# Patient Record
(demographics unavailable — no encounter records)

---

## 2024-10-16 NOTE — PHYSICAL EXAM
[Normal] : Gait: normal [Rodriguez's Sign] : negative Rodriguez's sign [Pronator Drift] : negative pronator drift [SLR] : negative straight leg raise [de-identified] : 5 out of 5 motor strength, sensation is intact and symmetrical full range of motion flexion extension and rotation, no palpatory tenderness full range of motion of hips knees shoulders and elbows (all four extremities), no atrophy, negative straight leg raise, no pathological reflexes, no swelling, normal ambulation, no apparent distress skin is intact, no palpable lymph nodes, no upper or lower extremity instability, alert and oriented x3 and normal mood. Normal finger-to nose test.  No upper motor neuron findings. Left SI joint pain. [de-identified] : I reviewed, interpreted and clinically correlated the following outside imaging studies,  CT CERVICAL SPINE ORDERED BY: ARIE SHORT  CT NECK SOFT TISSUE ORDERED BY: ARIE SHORT  PROCEDURE DATE: 10/08/2024    INTERPRETATION: CLINICAL INFORMATION: Anterior left neck pain with burning sensation radiating to her shoulders and cervical spine. History of right thyroid lobectomy (10/2020).  TECHNIQUE: Axial CT scanning of the brain was obtained from the C7 vertebral body to the vertex without the administration of intravenous contrast. Subsequent noncontrast CT of the soft tissues of the neck was performed. Sagittal and coronal reformatted images were obtained.  COMPARISON: CT chest 3/27/2023.  FINDINGS:  CT SOFT TISSUE NECK  The cutaneous and subcutaneous soft tissues are unremarkable. No evidence of collections. No lymphadenopathy.  The nasopharynx, oropharynx and larynx are unremarkable. No evidence of retropharyngeal soft tissue swelling.  Status post right thyroid lobectomy. Heterogeneous left thyroid lobe, grossly unchanged in appearance since 3/27/2023. Parotid and submandibular glands are within normal limits.  The epiglottis and aryepiglottic folds are unremarkable. No airway obstruction or tracheal deviation. No foreign body.  Esophagus is underdistended.  Upper dentures.  CT CERVICAL SPINE  There is normal cervical vertebral body height and alignment. Irregular contour of the anterior C2 vertebral body is stable compared to previous x-rays 6/3/2019. This may be related to previous trauma or congenital nature. Multilevel anterior and posterior osteophyte formation is appreciated, most prominent from C4 through C6.  Intervertebral disc space narrowing is appreciated at C5-6, C6-C7 and C7-T1. Multilevel uncovertebral hypertrophy and posterior facet arthropathies appreciated. The prevertebral soft tissues are normal. Please note that CT is not the optimal modality for evaluation of disc disease, which is better evaluated with MRI.  Miscellaneous: Emphysema. Aortic and branch vessel calcifications.   IMPRESSION: No acute pathology of the soft tissues of the neck.  Heterogeneous left thyroid lobe, unchanged since 3/27/2023.  No acute fracture. No spondylolisthesis. Multilevel degenerative changes of the cervical spine.  --- End of Report ---   MR SPINE LUMBAR - ORDERED BY: FRIEDA CABRERA   PROCEDURE DATE: 09/15/2024    INTERPRETATION: Clinical indication: Lumbar radiculopathy.  MRI of the lumbar spine was performed sagittal T1 and T2 and STIR sequences. Axial T2 and coronal T1-weighted sequence was performed  Reversal of the normal lumbar lordosis is seen.  Scoliosis is seen convex to the left.  The vertebral body height appears maintained.  Grade 1 anterolisthesis is seen involving L3 on L4, L4 on L5 and L5 on S1. These findings are secondary to chronic degenerative  Disc desiccation is seen throughout the lower thoracic and lumbar spine region. This is most prominent at the L2-3 level and is secondary chronic degenerative changes  Hemangioma is suspected in the T12 vertebral body  T12-L1: Normal  L1-2: Bilateral hypertrophic facet change. Mild narrowing of the spinal canal. Mild narrowing of the left neural foramen and moderate to severe narrowing of the right neural foramen  L2-3: Disc bulge and bilateral hypertrophic facet. Mild narrowing of the spinal canal. Moderate narrowing of the right neural foramen.  L3-4: Disc bulge and bilateral hypertrophic facet joint changes. Moderate narrowing of the spinal canal.  L4-5: Disc bulge and bilateral hypertrophic facet joint changes. Moderate to severe narrowing of the spinal canal. Mild narrowing of the right neural foramen  L5-S1: Disc bulge and bilateral hypertrophic facet facet. Far lateral disc herniation is suspected in the left side (6/38) mild to moderate narrowing of the right neural foramen  The conus ends at L2 and appears normal  Evaluation of the paraspinal soft tissues appear normal.  Bilateral renal cysts are seen right greater than left.  Both SI joints appear intact.  IMPRESSION: Degenerative changes as described above.  --- End of Report ---

## 2024-10-16 NOTE — HISTORY OF PRESENT ILLNESS
[Stable] : stable [de-identified] : 76 year old female presents for initial evaluation of left sided lower back and neck pain x several months.  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  She states that the neck pain is localized. The lower back pain is localized to the left lower back. Denies numbness/tingling.  Denies weakness of arms and legs. Denies gait instability.  Walking aggravates the pain.  She has tried lidocaine patches which helped. Can not take NSAIDs due to CKD.  Has not tried PT or chiropractic care. Denies NERY.  Has CT Cervical and MRI Lumbar.  PMHx: CKD, pre-DM, HTN, HLD, osteopenia, skin cancer  No fever, chills, sweats, nausea/vomiting. No bowel or bladder dysfunction, no recent weight loss or gain. No night pain. This history is in addition to the intake form that I personally reviewed.

## 2024-10-16 NOTE — ADDENDUM
[FreeTextEntry1] : This note was written by Buddy Serrato on 10/16/2024 acting as scribe for Dr. Lamine Clark M.D.  I, Lamine Clark MD, have read and attest that all the information, medical decision making and discharge instructions within are true and accurate.

## 2024-10-16 NOTE — DISCUSSION/SUMMARY
[de-identified] : Mild cervical degenerative disc disease. Lumbar disc degenerative disease.  Left sacroiliitis. Discussed all options. Referred to Dr. Buddy Lindsey for pain management. I offered an injection after all risks were explained including allergic reaction to an infection under sterile conditions 1 mg of Depo-Medrol and 2 cc of 1% Lidocaine without epinephrine was injected into the painful site. The patient tolerated the procedure well and received significant relief following the injection. (left SI) All options discussed including rest, medicine, home exercise, acupuncture, Chiropractic care, Physical Therapy, Pain management, and last resort surgery. All questions were answered, all alternatives discussed, and the patient is in complete agreement with the treatment plan which the patient contributed to and discussed with me through the shared decision-making process. Follow-up appointment as instructed. Any issues and the patient will call or come in sooner.

## 2024-11-05 NOTE — CONSULT LETTER
[Courtesy Letter:] : I had the pleasure of seeing your patient, [unfilled], in my office today. [Please see my note below.] : Please see my note below. [Sincerely,] : Sincerely, [Dear  ___] : Dear  [unfilled], [FreeTextEntry2] : Moshe White MD  [FreeTextEntry3] : Tanya Wilkins PA-C Department of Otolaryngology [facility] at [place]   Vickey Gutierrez MD, FACS Chief of Otolaryngology at [facility]  Dept. of Otolaryngology [facility]

## 2024-11-05 NOTE — REASON FOR VISIT
[Initial Evaluation] : an initial evaluation for [FreeTextEntry2] : thyroid nodules and hearing loss

## 2024-11-05 NOTE — DATA REVIEWED
[de-identified] : 6/2/2022 Thyroid Ultrasound Report    Technique: multiple real time longitudinal and transverse images were obtained using a high resolution ultrasound with a linear transducer, Popcorn5 e 2008 model, 10-12 MHz frequencies. All measurements will be reported as longitudinal x bebeto-posterior x transverse.     Findings:  The right thyroid lobe measures 3.47 x 2.27 x 2.25 cm. The isthmus measures 0.25 cm.   The left thyroid lobe has a homogeneous parenchyma.     In the isthmus there is a  mixed. It measures 1.02 x 0.35 x 0.55 cm. The nodule is ovoid in shape and the border is smooth. The nodule does not have a halo. It demonstrates no calcification. It has peripheral vascularity. Doppler grade 2 on power doppler.    In the left lower pole there is a  solid, isoechoic nodule. It measures 1.7 x 1.24 x 1.69 cm. The nodule is ovoid in shape and the border is. The nodule has a thin halo. It demonstrates no calcifications. It has peripheral vascularity. Doppler grade 2 on power doppler.    In the left upper pole there is a  mixed. It measures 0.9 x 0.46 x 0.95 cm. The nodule is ovoid in shape and the border is distinct. The nodule does not have a halo. It demonstrates no calcifications. It has peripheral vascularity. Doppler grade 2 on power doppler.            Lymph nodes: no abnormal lymph nodes are seen.       Impression      Left sided thyroid nodules, displaying interval stability and are low suspicion    Recommendations: Follow up ultrasound in one year.       Electronically signed by : BRENNAN REMY MD; Jun 2 2022  1:12PM EST (Author)   [de-identified] : PROCEDURE DATE: 10/08/2024    INTERPRETATION: CLINICAL INFORMATION: Anterior left neck pain with burning sensation radiating to her shoulders and cervical spine. History of right thyroid lobectomy (10/2020).  TECHNIQUE: Axial CT scanning of the brain was obtained from the C7 vertebral body to the vertex without the administration of intravenous contrast. Subsequent noncontrast CT of the soft tissues of the neck was performed. Sagittal and coronal reformatted images were obtained.  COMPARISON: CT chest 3/27/2023.  FINDINGS:  CT SOFT TISSUE NECK  The cutaneous and subcutaneous soft tissues are unremarkable. No evidence of collections. No lymphadenopathy.  The nasopharynx, oropharynx and larynx are unremarkable. No evidence of retropharyngeal soft tissue swelling.  Status post right thyroid lobectomy. Heterogeneous left thyroid lobe, grossly unchanged in appearance since 3/27/2023. Parotid and submandibular glands are within normal limits.  The epiglottis and aryepiglottic folds are unremarkable. No airway obstruction or tracheal deviation. No foreign body.  Esophagus is underdistended.  Upper dentures.  CT CERVICAL SPINE  There is normal cervical vertebral body height and alignment. Irregular contour of the anterior C2 vertebral body is stable compared to previous x-rays 6/3/2019. This may be related to previous trauma or congenital nature. Multilevel anterior and posterior osteophyte formation is appreciated, most prominent from C4 through C6.  Intervertebral disc space narrowing is appreciated at C5-6, C6-C7 and C7-T1. Multilevel uncovertebral hypertrophy and posterior facet arthropathies appreciated. The prevertebral soft tissues are normal. Please note that CT is not the optimal modality for evaluation of disc disease, which is better evaluated with MRI.  Miscellaneous: Emphysema. Aortic and branch vessel calcifications.   IMPRESSION: No acute pathology of the soft tissues of the neck.  Heterogeneous left thyroid lobe, unchanged since 3/27/2023.  No acute fracture. No spondylolisthesis. Multilevel degenerative changes of the cervical spine.  --- End of Report ---

## 2024-11-05 NOTE — HISTORY OF PRESENT ILLNESS
[de-identified] : 76F presents for neck mass s/p right thyroid lobectomy 10/27/2020. She reports a small mobile lump on the left side of her neck under her jaw. She noticed it 6 weeks ago and it has not changed in size and does not cause her pain. Last thyroid US was 6/2/2022 showing left sided thyroid nodules. Denies dysphagia, odynophagia, dysphonia, dyspnea.  Patient also reports hearing loss.

## 2024-11-05 NOTE — ASSESSMENT
[FreeTextEntry1] : No palpable mass, but will send for US Neck to verify  Follow up Thyroid US for thyroid nodules